# Patient Record
Sex: FEMALE | Race: BLACK OR AFRICAN AMERICAN | NOT HISPANIC OR LATINO | Employment: UNEMPLOYED | URBAN - METROPOLITAN AREA
[De-identification: names, ages, dates, MRNs, and addresses within clinical notes are randomized per-mention and may not be internally consistent; named-entity substitution may affect disease eponyms.]

---
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
## 2020-10-03 ENCOUNTER — HOSPITAL ENCOUNTER (EMERGENCY)
Facility: HOSPITAL | Age: 20
Discharge: HOME | End: 2020-10-03
Attending: EMERGENCY MEDICINE
Payer: COMMERCIAL

## 2020-10-03 VITALS
DIASTOLIC BLOOD PRESSURE: 87 MMHG | HEART RATE: 116 BPM | OXYGEN SATURATION: 100 % | WEIGHT: 218 LBS | RESPIRATION RATE: 18 BRPM | SYSTOLIC BLOOD PRESSURE: 165 MMHG

## 2020-10-03 DIAGNOSIS — N30.90 CYSTITIS: Primary | ICD-10-CM

## 2020-10-03 LAB
BACTERIA UR QL AUTO: ABNORMAL /HPF
BILIRUB UR QL STRIP: NEGATIVE
CLARITY UR: ABNORMAL
COLOR UR: YELLOW
EXT PREG TEST URINE: NEGATIVE
EXT. CONTROL ED NAV: NORMAL
GLUCOSE UR STRIP-MCNC: NEGATIVE MG/DL
HGB UR QL STRIP.AUTO: ABNORMAL
KETONES UR STRIP-MCNC: NEGATIVE MG/DL
LEUKOCYTE ESTERASE UR QL STRIP: ABNORMAL
NITRITE UR QL STRIP: NEGATIVE
NON-SQ EPI CELLS URNS QL MICRO: ABNORMAL /HPF
PH UR STRIP.AUTO: 6 [PH]
PROT UR STRIP-MCNC: ABNORMAL MG/DL
RBC #/AREA URNS AUTO: ABNORMAL /HPF
SP GR UR STRIP.AUTO: 1.02 (ref 1–1.03)
UROBILINOGEN UR QL STRIP.AUTO: 1 E.U./DL
WBC #/AREA URNS AUTO: ABNORMAL /HPF

## 2020-10-03 PROCEDURE — 99284 EMERGENCY DEPT VISIT MOD MDM: CPT

## 2020-10-03 PROCEDURE — 87077 CULTURE AEROBIC IDENTIFY: CPT | Performed by: EMERGENCY MEDICINE

## 2020-10-03 PROCEDURE — 81025 URINE PREGNANCY TEST: CPT | Performed by: EMERGENCY MEDICINE

## 2020-10-03 PROCEDURE — 81001 URINALYSIS AUTO W/SCOPE: CPT | Performed by: EMERGENCY MEDICINE

## 2020-10-03 PROCEDURE — 87186 SC STD MICRODIL/AGAR DIL: CPT | Performed by: EMERGENCY MEDICINE

## 2020-10-03 PROCEDURE — 87086 URINE CULTURE/COLONY COUNT: CPT | Performed by: EMERGENCY MEDICINE

## 2020-10-03 PROCEDURE — 99284 EMERGENCY DEPT VISIT MOD MDM: CPT | Performed by: EMERGENCY MEDICINE

## 2020-10-03 RX ORDER — CEPHALEXIN 500 MG/1
500 CAPSULE ORAL EVERY 12 HOURS SCHEDULED
Qty: 14 CAPSULE | Refills: 0 | Status: SHIPPED | OUTPATIENT
Start: 2020-10-03 | End: 2020-10-10

## 2020-10-03 RX ORDER — CEPHALEXIN 500 MG/1
500 CAPSULE ORAL ONCE
Status: COMPLETED | OUTPATIENT
Start: 2020-10-03 | End: 2020-10-03

## 2020-10-03 RX ADMIN — CEPHALEXIN 500 MG: 500 CAPSULE ORAL at 02:19

## 2020-10-05 LAB — BACTERIA UR CULT: ABNORMAL

## 2022-09-13 ENCOUNTER — TELEPHONE (OUTPATIENT)
Dept: BARIATRICS | Facility: CLINIC | Age: 22
End: 2022-09-13

## 2022-09-13 NOTE — TELEPHONE ENCOUNTER
yanet for pt to call back and schedule appt with provider she is interested in VLCD after attending info seminar

## 2022-09-27 ENCOUNTER — CONSULT (OUTPATIENT)
Dept: BARIATRICS | Facility: CLINIC | Age: 22
End: 2022-09-27
Payer: COMMERCIAL

## 2022-09-27 VITALS
BODY MASS INDEX: 38.29 KG/M2 | HEART RATE: 101 BPM | WEIGHT: 229.8 LBS | HEIGHT: 65 IN | SYSTOLIC BLOOD PRESSURE: 136 MMHG | DIASTOLIC BLOOD PRESSURE: 94 MMHG

## 2022-09-27 DIAGNOSIS — E66.9 OBESITY, CLASS II, BMI 35-39.9: Primary | ICD-10-CM

## 2022-09-27 PROCEDURE — 99203 OFFICE O/P NEW LOW 30 MIN: CPT | Performed by: FAMILY MEDICINE

## 2022-09-27 RX ORDER — NORGESTIMATE AND ETHINYL ESTRADIOL 7DAYSX3 28
1 KIT ORAL DAILY
COMMUNITY
Start: 2022-08-15

## 2022-09-27 NOTE — PROGRESS NOTES
Assessment/Plan:  Rea Cornell was seen today for consult  Diagnoses and all orders for this visit:    Obesity, Class II, BMI 35-39 9    Suspect secondary to excess caloric intake and lack of physical activity  Patient has decided to pursue very low-calorie diet  Concept of very low-calorie diet explained and patient acknowledges understanding  She will need to increase her water intake to at least 80 oz of water daily while on very low-calorie diet however up until then I recommended at least 64 oz of water daily  She will need to avoid strenuous exercise while on very low-calorie diet  She did bring in labs for review  CBC unremarkable  CMP with mild elevation of creatinine  See below for further discussion  She did bring in EKG for review  EKG unremarkable  Labs and EKG will be scanned into epic  VLCD Review:  Contraindications: no  Labs ordered: yes, magnesium, thyroid and BMP to reassess renal function due to slightly elevated creatinine on recent labs patient brought in for review  I have asked that she make sure she is well hydrated prior to having her labs drawn as in adequate water intake is likely the culprit of her slight rise in the creatinine  EKG ordered: no, patient already had EKG performed in August and was unremarkable  Results scanned into epic  HTN meds addressed: n/a  DM2 meds addressed: n/a  VLCD time restriction based on BMI: no  LMP/OCP:  On oral contraceptive but will need urine pregnancy testing prior to starting very low-calorie diet  - Discussed options of [facility]-Intensive Lifestyle Intervention Program, Very Low Calorie Diet-VLCD and Conservative Program and the role of weight loss medications  - Explained the importance of making lifestyle changes first before starting any anti-obesity medications  Patient should demonstrate lifestyle changes first before anti-obesity medication can be initiated     - Patient is interested in pursuing Very Low Calorie Diet-VLCD  - Initial weight loss goal of 5-10% weight loss for improved health  - Weight loss can improve patient's co-morbid conditions and/or prevent weight-related complications  Total time spent: 30 min, with >50% face-to-face time spent counseling patient on nonsurgical interventions for the treatment of excess weight  Discussed in detail nonsurgical options including intensive lifestyle intervention program, very low-calorie diet program and conservative program   Discussed the role of weight loss medications  Counseled patient on diet behavior and exercise modification for weight loss  Follow up in approximately 2 weeks with Non-Surgical Dietician  Subjective:   Chief Complaint   Patient presents with    Consult     Pt is here today for MWM consult  Patient ID: Barak Lee  is a 25 y o  female with excess weight/obesity here to pursue weight management  Previous notes and records have been reviewed  History reviewed  No pertinent past medical history  Past Surgical History:   Procedure Laterality Date    WISDOM TOOTH EXTRACTION Bilateral 2014       HPI:  Patient presents for medical weight management consultation  Wt Readings from Last 20 Encounters:   09/27/22 104 kg (229 lb 12 8 oz)   10/03/20 98 9 kg (218 lb)   02/09/16 82 1 kg (181 lb) (97 %, Z= 1 83)*   11/11/15 80 7 kg (178 lb) (96 %, Z= 1 80)*     * Growth percentiles are based on CDC (Girls, 2-20 Years) data       Obesity/Excess Weight:  Severity: Moderate  Onset:  lifelong    Modifiers: Diet and Exercise  Contributing factors: Poor Food Choices and Insufficient Physical Activity  Associated symptoms: increased joint pain    B- skips (5/7 days) or eggs and ramirez  S- no  L- takeout due to lack of time  S- no  D- rice, bbq chicken and brocoli  S- no    Hydration: 16oz water daily, 8oz coffee sweetened creamer daily (not measured), 24-32oz apple juice  Alcohol: no  Smoking: no  Exercise: no  Occupation: IT (sedentary)  Sleep:8hrs  STOP bang: 3/8    The following portions of the patient's history were reviewed and updated as appropriate: allergies, current medications, past family history, past medical history, past social history, past surgical history, and problem list     Review of Systems   Constitutional: Negative for fever  Respiratory: Negative for shortness of breath  Cardiovascular: Negative for chest pain  Gastrointestinal: Negative for abdominal pain and blood in stool  Genitourinary: Negative for dysuria and hematuria  Musculoskeletal: Positive for arthralgias and back pain  Negative for myalgias  Skin: Positive for rash (PCP started cream   Pt unsure of name of med  )  Neurological: Negative for headaches  Psychiatric/Behavioral: Negative for dysphoric mood and suicidal ideas  The patient is not nervous/anxious  Objective:  /94 (BP Location: Left arm, Patient Position: Sitting, Cuff Size: Large)   Pulse 101   Ht 5' 5" (1 651 m)   Wt 104 kg (229 lb 12 8 oz)   LMP 09/21/2022   BMI 38 24 kg/m²   Constitutional: Well-developed, well-nourished and Obese Body mass index is 38 24 kg/m²  Mariola Peck HEENT: No conjunctival injection  Pulmonary: No increased work of breathing or signs of respiratory distress  CV: Well-perfused  GI: Obese  Non-distended  MSK: No edema   Neuro: Oriented to person, place and time  Normal Speech  Normal gait  Psych: Normal affect and mood  Labs and Imaging  Recent labs and imaging have been personally reviewed    No results found for: WBC, HGB, HCT, MCV, PLT  No results found for: NA, SODIUM, K, CL, CO2, ANIONGAP, AGAP, BUN, CREATININE, GLUC, GLUF, CALCIUM, AST, ALT, ALKPHOS, PROT, TP, BILITOT, TBILI, EGFR  No results found for: HGBA1C  No results found for: [id number], TSH  No results found for: CHOLESTEROL  No results found for: HDL  No results found for: TRIG  No results found for: [facility]

## 2023-09-22 ENCOUNTER — HOSPITAL ENCOUNTER (OUTPATIENT)
Dept: NON INVASIVE DIAGNOSTICS | Facility: HOSPITAL | Age: 23
Discharge: HOME | End: 2023-09-22
Attending: INTERNAL MEDICINE
Payer: COMMERCIAL

## 2023-09-22 ENCOUNTER — APPOINTMENT (OUTPATIENT)
Dept: LAB | Facility: HOSPITAL | Age: 23
End: 2023-09-22
Payer: COMMERCIAL

## 2023-09-22 VITALS
HEIGHT: 65 IN | SYSTOLIC BLOOD PRESSURE: 152 MMHG | WEIGHT: 229.28 LBS | DIASTOLIC BLOOD PRESSURE: 95 MMHG | HEART RATE: 96 BPM | BODY MASS INDEX: 38.2 KG/M2

## 2023-09-22 DIAGNOSIS — E66.1 DRUG-INDUCED OBESITY IN PEDIATRIC PATIENT, UNSPECIFIED BMI, UNSPECIFIED WHETHER SERIOUS COMORBIDITY PRESENT: ICD-10-CM

## 2023-09-22 DIAGNOSIS — R07.9 CHEST PAIN: ICD-10-CM

## 2023-09-22 DIAGNOSIS — Z00.00 ROUTINE GENERAL MEDICAL EXAMINATION AT A HEALTH CARE FACILITY: ICD-10-CM

## 2023-09-22 DIAGNOSIS — R07.89 OTHER CHEST PAIN: ICD-10-CM

## 2023-09-22 LAB
AORTIC ROOT: 2.6 CM
APICAL FOUR CHAMBER EJECTION FRACTION: 73 %
E WAVE DECELERATION TIME: 197 MS
FRACTIONAL SHORTENING: 36 (ref 28–44)
INTERVENTRICULAR SEPTUM IN DIASTOLE (PARASTERNAL SHORT AXIS VIEW): 1 CM
INTERVENTRICULAR SEPTUM: 1 CM (ref 0.6–1.1)
LAAS-AP2: 17.8 CM2
LAAS-AP4: 17.3 CM2
LEFT ATRIUM SIZE: 3.5 CM
LEFT ATRIUM VOLUME (MOD BIPLANE): 44 ML
LEFT INTERNAL DIMENSION IN SYSTOLE: 3 CM (ref 2.1–4)
LEFT VENTRICULAR INTERNAL DIMENSION IN DIASTOLE: 4.7 CM (ref 3.5–6)
LEFT VENTRICULAR POSTERIOR WALL IN END DIASTOLE: 1 CM
LEFT VENTRICULAR STROKE VOLUME: 69 ML
LVSV (TEICH): 69 ML
MV E'TISSUE VEL-LAT: 15 CM/S
MV E'TISSUE VEL-SEP: 11 CM/S
MV PEAK A VEL: 0.62 M/S
MV PEAK E VEL: 85 CM/S
MV STENOSIS PRESSURE HALF TIME: 57 MS
MV VALVE AREA P 1/2 METHOD: 3.86
RIGHT ATRIUM AREA SYSTOLE A4C: 9.1 CM2
RIGHT VENTRICLE ID DIMENSION: 2.8 CM
SL CV LEFT ATRIUM LENGTH A2C: 5.2 CM
SL CV LV EF: 60
SL CV PED ECHO LEFT VENTRICLE DIASTOLIC VOLUME (MOD BIPLANE) 2D: 102 ML
SL CV PED ECHO LEFT VENTRICLE SYSTOLIC VOLUME (MOD BIPLANE) 2D: 34 ML
TR MAX PG: 15 MMHG
TR PEAK VELOCITY: 1.9 M/S
TRICUSPID ANNULAR PLANE SYSTOLIC EXCURSION: 1.7 CM
TRICUSPID VALVE PEAK REGURGITATION VELOCITY: 1.93 M/S

## 2023-09-22 PROCEDURE — 93306 TTE W/DOPPLER COMPLETE: CPT

## 2023-09-22 PROCEDURE — 93306 TTE W/DOPPLER COMPLETE: CPT | Performed by: INTERNAL MEDICINE

## 2023-09-24 LAB
ATRIAL RATE: 91 BPM
P AXIS: 61 DEGREES
PR INTERVAL: 184 MS
QRS AXIS: 79 DEGREES
QRSD INTERVAL: 80 MS
QT INTERVAL: 348 MS
QTC INTERVAL: 428 MS
T WAVE AXIS: 33 DEGREES
VENTRICULAR RATE: 91 BPM

## 2023-09-24 PROCEDURE — 93010 ELECTROCARDIOGRAM REPORT: CPT | Performed by: INTERNAL MEDICINE

## 2024-06-17 DIAGNOSIS — Z00.6 ENCOUNTER FOR EXAMINATION FOR NORMAL COMPARISON OR CONTROL IN CLINICAL RESEARCH PROGRAM: ICD-10-CM

## 2024-06-21 ENCOUNTER — APPOINTMENT (OUTPATIENT)
Dept: LAB | Facility: HOSPITAL | Age: 24
End: 2024-06-21

## 2024-06-21 DIAGNOSIS — Z00.6 ENCOUNTER FOR EXAMINATION FOR NORMAL COMPARISON OR CONTROL IN CLINICAL RESEARCH PROGRAM: ICD-10-CM

## 2024-06-21 PROCEDURE — 36415 COLL VENOUS BLD VENIPUNCTURE: CPT

## 2024-07-05 LAB
APOB+LDLR+PCSK9 GENE MUT ANL BLD/T: NOT DETECTED
BRCA1+BRCA2 DEL+DUP + FULL MUT ANL BLD/T: NOT DETECTED
MLH1+MSH2+MSH6+PMS2 GN DEL+DUP+FUL M: NOT DETECTED

## 2025-06-09 ENCOUNTER — APPOINTMENT (EMERGENCY)
Dept: RADIOLOGY | Facility: HOSPITAL | Age: 25
End: 2025-06-09
Payer: COMMERCIAL

## 2025-06-09 ENCOUNTER — HOSPITAL ENCOUNTER (EMERGENCY)
Facility: HOSPITAL | Age: 25
Discharge: HOME | End: 2025-06-09
Payer: COMMERCIAL

## 2025-06-09 VITALS
RESPIRATION RATE: 18 BRPM | BODY MASS INDEX: 37.21 KG/M2 | SYSTOLIC BLOOD PRESSURE: 140 MMHG | DIASTOLIC BLOOD PRESSURE: 81 MMHG | HEART RATE: 103 BPM | WEIGHT: 223.6 LBS | OXYGEN SATURATION: 98 % | TEMPERATURE: 98.8 F

## 2025-06-09 DIAGNOSIS — R07.89 NON-CARDIAC CHEST PAIN: Primary | ICD-10-CM

## 2025-06-09 LAB
ALBUMIN SERPL BCG-MCNC: 4.1 G/DL (ref 3.5–5)
ALP SERPL-CCNC: 72 U/L (ref 34–104)
ALT SERPL W P-5'-P-CCNC: 11 U/L (ref 7–52)
ANION GAP SERPL CALCULATED.3IONS-SCNC: 4 MMOL/L (ref 4–13)
AST SERPL W P-5'-P-CCNC: 12 U/L (ref 13–39)
BASOPHILS # BLD AUTO: 0.04 THOUSANDS/ÂΜL (ref 0–0.1)
BASOPHILS NFR BLD AUTO: 1 % (ref 0–1)
BILIRUB SERPL-MCNC: 0.35 MG/DL (ref 0.2–1)
BUN SERPL-MCNC: 9 MG/DL (ref 5–25)
CALCIUM SERPL-MCNC: 8.5 MG/DL (ref 8.4–10.2)
CARDIAC TROPONIN I PNL SERPL HS: <2 NG/L (ref ?–50)
CHLORIDE SERPL-SCNC: 110 MMOL/L (ref 96–108)
CO2 SERPL-SCNC: 25 MMOL/L (ref 21–32)
CREAT SERPL-MCNC: 0.81 MG/DL (ref 0.6–1.3)
D DIMER PPP FEU-MCNC: 0.59 UG/ML FEU
EOSINOPHIL # BLD AUTO: 0.13 THOUSAND/ÂΜL (ref 0–0.61)
EOSINOPHIL NFR BLD AUTO: 2 % (ref 0–6)
ERYTHROCYTE [DISTWIDTH] IN BLOOD BY AUTOMATED COUNT: 13.7 % (ref 11.6–15.1)
EXT PREGNANCY TEST URINE: NEGATIVE
EXT. CONTROL: NORMAL
GFR SERPL CREATININE-BSD FRML MDRD: 101 ML/MIN/1.73SQ M
GLUCOSE SERPL-MCNC: 91 MG/DL (ref 65–140)
HCT VFR BLD AUTO: 38.7 % (ref 34.8–46.1)
HGB BLD-MCNC: 12.1 G/DL (ref 11.5–15.4)
IMM GRANULOCYTES # BLD AUTO: 0.03 THOUSAND/UL (ref 0–0.2)
IMM GRANULOCYTES NFR BLD AUTO: 0 % (ref 0–2)
LIPASE SERPL-CCNC: 60 U/L (ref 11–82)
LYMPHOCYTES # BLD AUTO: 1.92 THOUSANDS/ÂΜL (ref 0.6–4.47)
LYMPHOCYTES NFR BLD AUTO: 23 % (ref 14–44)
MAGNESIUM SERPL-MCNC: 1.9 MG/DL (ref 1.9–2.7)
MCH RBC QN AUTO: 28.6 PG (ref 26.8–34.3)
MCHC RBC AUTO-ENTMCNC: 31.3 G/DL (ref 31.4–37.4)
MCV RBC AUTO: 92 FL (ref 82–98)
MONOCYTES # BLD AUTO: 1.11 THOUSAND/ÂΜL (ref 0.17–1.22)
MONOCYTES NFR BLD AUTO: 13 % (ref 4–12)
NEUTROPHILS # BLD AUTO: 5.03 THOUSANDS/ÂΜL (ref 1.85–7.62)
NEUTS SEG NFR BLD AUTO: 61 % (ref 43–75)
NRBC BLD AUTO-RTO: 0 /100 WBCS
PLATELET # BLD AUTO: 388 THOUSANDS/UL (ref 149–390)
PMV BLD AUTO: 10.7 FL (ref 8.9–12.7)
POTASSIUM SERPL-SCNC: 4 MMOL/L (ref 3.5–5.3)
PROT SERPL-MCNC: 7.2 G/DL (ref 6.4–8.4)
RBC # BLD AUTO: 4.23 MILLION/UL (ref 3.81–5.12)
SODIUM SERPL-SCNC: 139 MMOL/L (ref 135–147)
TSH SERPL DL<=0.05 MIU/L-ACNC: 1.46 UIU/ML (ref 0.45–4.5)
WBC # BLD AUTO: 8.26 THOUSAND/UL (ref 4.31–10.16)

## 2025-06-09 PROCEDURE — 93005 ELECTROCARDIOGRAM TRACING: CPT

## 2025-06-09 PROCEDURE — 71275 CT ANGIOGRAPHY CHEST: CPT

## 2025-06-09 PROCEDURE — 99285 EMERGENCY DEPT VISIT HI MDM: CPT | Performed by: PHYSICIAN ASSISTANT

## 2025-06-09 PROCEDURE — 84443 ASSAY THYROID STIM HORMONE: CPT | Performed by: PHYSICIAN ASSISTANT

## 2025-06-09 PROCEDURE — 96361 HYDRATE IV INFUSION ADD-ON: CPT

## 2025-06-09 PROCEDURE — 36415 COLL VENOUS BLD VENIPUNCTURE: CPT | Performed by: PHYSICIAN ASSISTANT

## 2025-06-09 PROCEDURE — 85025 COMPLETE CBC W/AUTO DIFF WBC: CPT | Performed by: PHYSICIAN ASSISTANT

## 2025-06-09 PROCEDURE — 83690 ASSAY OF LIPASE: CPT | Performed by: PHYSICIAN ASSISTANT

## 2025-06-09 PROCEDURE — 71045 X-RAY EXAM CHEST 1 VIEW: CPT

## 2025-06-09 PROCEDURE — 80053 COMPREHEN METABOLIC PANEL: CPT | Performed by: PHYSICIAN ASSISTANT

## 2025-06-09 PROCEDURE — 85379 FIBRIN DEGRADATION QUANT: CPT | Performed by: PHYSICIAN ASSISTANT

## 2025-06-09 PROCEDURE — 84484 ASSAY OF TROPONIN QUANT: CPT | Performed by: PHYSICIAN ASSISTANT

## 2025-06-09 PROCEDURE — 81025 URINE PREGNANCY TEST: CPT | Performed by: PHYSICIAN ASSISTANT

## 2025-06-09 PROCEDURE — 83735 ASSAY OF MAGNESIUM: CPT | Performed by: PHYSICIAN ASSISTANT

## 2025-06-09 PROCEDURE — 96374 THER/PROPH/DIAG INJ IV PUSH: CPT

## 2025-06-09 PROCEDURE — 99285 EMERGENCY DEPT VISIT HI MDM: CPT

## 2025-06-09 RX ORDER — SEMAGLUTIDE 1.7 MG/.75ML
1.7 INJECTION, SOLUTION SUBCUTANEOUS
COMMUNITY
Start: 2025-04-17 | End: 2025-07-07

## 2025-06-09 RX ORDER — NAPROXEN 500 MG/1
500 TABLET ORAL 2 TIMES DAILY WITH MEALS
Qty: 30 TABLET | Refills: 0 | Status: SHIPPED | OUTPATIENT
Start: 2025-06-09

## 2025-06-09 RX ORDER — KETOROLAC TROMETHAMINE 30 MG/ML
15 INJECTION, SOLUTION INTRAMUSCULAR; INTRAVENOUS ONCE
Status: COMPLETED | OUTPATIENT
Start: 2025-06-09 | End: 2025-06-09

## 2025-06-09 RX ADMIN — KETOROLAC TROMETHAMINE 15 MG: 30 INJECTION, SOLUTION INTRAMUSCULAR; INTRAVENOUS at 20:19

## 2025-06-09 RX ADMIN — IOHEXOL 85 ML: 350 INJECTION, SOLUTION INTRAVENOUS at 19:42

## 2025-06-09 RX ADMIN — SODIUM CHLORIDE 1000 ML: 0.9 INJECTION, SOLUTION INTRAVENOUS at 18:00

## 2025-06-09 NOTE — ED PROVIDER NOTES
Time reflects when diagnosis was documented in both MDM as applicable and the Disposition within this note       Time User Action Codes Description Comment    6/9/2025  9:08 PM Italo Steen Add [R07.89] Non-cardiac chest pain           ED Disposition       ED Disposition   Discharge    Condition   Stable    Date/Time   Mon Jun 9, 2025  9:08 PM    Comment   Ritika Alberto discharge to home/self care.                   Assessment & Plan       Medical Decision Making  Patient is a 25-year-old female with no significant past medical or surgical history that presents to the emergency department with intermittent sharp shooting nonradiating left-sided chest pain symptoms for the past 4 days.      Patient hemodynamically stable and afebrile  No sirs  + wells, + perc + ddimer- palpitations/ chest pain on inspiration; reports recent flight last month to [place]- currently on oral contraceptives  Neg urine pregnancy  No leukocytosis no bandemia- doubt infection  Cxr with nad  Ecg nsr with no acute ischemia  Sign out to Dr. Steen pending remainder of blood labs and ct chest pe study    *Due to voice recognition software, sound alike and misspelled words may be contained in the documentation*      Amount and/or Complexity of Data Reviewed  Labs: ordered. Decision-making details documented in ED Course.  Radiology: ordered and independent interpretation performed. Decision-making details documented in ED Course.  ECG/medicine tests: ordered and independent interpretation performed. Decision-making details documented in ED Course.    Risk  Prescription drug management.        ED Course as of 06/09/25 2146   Mon Jun 09, 2025   1851 D-Dimer, Quant(!): 0.59  Cta pe study ordered   1913 Sign out to Dr. Steen, pending blood work and ct scan       Medications   sodium chloride 0.9 % bolus 1,000 mL (0 mL Intravenous Stopped 6/9/25 2114)   ketorolac (TORADOL) injection 15 mg (15 mg Intravenous Given 6/9/25 2019)   iohexol (OMNIPAQUE)  "350 MG/ML injection (MULTI-DOSE) 85 mL (85 mL Intravenous Given 6/9/25 1942)       ED Risk Strat Scores                    No data recorded    PERC Rule for PE      Flowsheet Row Most Recent Value   PERC Rule for PE    Age >=50 0 Filed at: 06/09/2025 1649   HR >=100 1 Filed at: 06/09/2025 1649   O2 Sat on room air < 95% 0 Filed at: 06/09/2025 1649   History of PE or DVT 0 Filed at: 06/09/2025 1649   Recent trauma or surgery 0 Filed at: 06/09/2025 1649   Hemoptysis 0 Filed at: 06/09/2025 1649   Exogenous estrogen 0 Filed at: 06/09/2025 1649   Unilateral leg swelling 0 Filed at: 06/09/2025 1649   PERC Rule for PE Results 1 Filed at: 06/09/2025 1649            SBIRT 22yo+      Flowsheet Row Most Recent Value   Initial Alcohol Screen: US AUDIT-C     1. How often do you have a drink containing alcohol? 0 Filed at: 06/09/2025 1556   3b. FEMALE Any Age, or MALE 65+: How often do you have 4 or more drinks on one occassion? 0 Filed at: 06/09/2025 1556   Audit-C Score 0 Filed at: 06/09/2025 1556   VINNIE: How many times in the past year have you...    Used an illegal drug or used a prescription medication for non-medical reasons? Never Filed at: 06/09/2025 1556            Wells' Criteria for PE      Flowsheet Row Most Recent Value   Wells' Criteria for PE    Clinical signs and symptoms of DVT 0 Filed at: 06/09/2025 1648   PE is primary diagnosis or equally likely 0 Filed at: 06/09/2025 1648   HR >100 1.5 Filed at: 06/09/2025 1648   Immobilization at least 3 days or Surgery in the previous 4 weeks 0 Filed at: 06/09/2025 1648   Previous, objectively diagnosed PE or DVT 0 Filed at: 06/09/2025 1648   Hemoptysis 0 Filed at: 06/09/2025 1648   Malignancy with treatment within 6 months or palliative 0 Filed at: 06/09/2025 1648   Wells' Criteria Total 1.5 Filed at: 06/09/2025 1648                        History of Present Illness       Chief Complaint   Patient presents with    Chest Pain     States started on 6/5 with \" stabbing pains " "in my heart \". Had heart palpitations in past with phentermine which she does not take any longer      Patient is a 25-year-old female with no significant past medical or surgical history that presents to the emergency department with intermittent sharp shooting nonradiating left-sided chest pain symptoms for the past 4 days.  Patient affirms associated symptoms of palpitations beginning with the current ed presentation of left sided chest pain.  Patient states that in May she had a recent flight to [place].  Patient denies history of DVT and PE.  Patient states that she started Wegovy last year 2024.  Patient denies other stimulant or caffeine use.  Patient denies illicit drug use.  Patient denies any rash or skin changes.  Patient denies palliative and provocative factors.  Patient denies noneffective treatment.  Patient denies fevers, chills, nausea, vomiting, diarrhea, constipation and urine symptoms.  Patient denies recent fall and recent trauma.  Patient denies sick contacts and recent travel.  Patient denies shortness breath, and abdominal pain.    Past Medical History[1]   Past Surgical History[2]   Family History[3]   Social History[4]   E-Cigarette/Vaping    E-Cigarette Use Never User       E-Cigarette/Vaping Substances    Nicotine No     THC No     CBD No     Flavoring No     Other No     Unknown No       I have reviewed and agree with the history as documented.       History provided by:  Patient   used: No    Chest Pain  Associated symptoms: no abdominal pain, no back pain, no cough, no dizziness, no fever, no headache, no nausea, no numbness, no palpitations, no shortness of breath, not vomiting and no weakness        Review of Systems   Constitutional:  Negative for activity change, appetite change, chills and fever.   HENT:  Negative for congestion, ear pain, postnasal drip, rhinorrhea, sinus pressure, sinus pain, sore throat and tinnitus.    Eyes:  Negative for photophobia, pain " and visual disturbance.   Respiratory:  Negative for cough, chest tightness and shortness of breath.    Cardiovascular:  Positive for chest pain. Negative for palpitations.   Gastrointestinal:  Negative for abdominal pain, constipation, diarrhea, nausea and vomiting.   Genitourinary:  Negative for difficulty urinating, dysuria, flank pain, frequency, hematuria and urgency.   Musculoskeletal:  Negative for arthralgias, back pain, gait problem, neck pain and neck stiffness.   Skin:  Negative for color change, pallor and rash.   Allergic/Immunologic: Negative for environmental allergies and food allergies.   Neurological:  Negative for dizziness, seizures, syncope, weakness, numbness and headaches.   Psychiatric/Behavioral:  Negative for confusion.    All other systems reviewed and are negative.          Objective       ED Triage Vitals [06/09/25 1553]   Temperature Pulse Blood Pressure Respirations SpO2 Patient Position - Orthostatic VS   98.8 °F (37.1 °C) 103 140/81 18 98 % Sitting      Temp Source Heart Rate Source BP Location FiO2 (%) Pain Score    Tympanic Monitor Left arm -- 8      Vitals      Date and Time Temp Pulse SpO2 Resp BP Pain Score FACES Pain Rating User   06/09/25 2026 -- -- -- -- -- 3 -- AM   06/09/25 1553 98.8 °F (37.1 °C) 103 98 % 18 140/81 8 -- SW            Physical Exam  Vitals and nursing note reviewed.   Constitutional:       General: She is awake.      Appearance: Normal appearance. She is well-developed. She is not ill-appearing, toxic-appearing or diaphoretic.      Comments: /81 (BP Location: Left arm)   Pulse 103   Temp 98.8 °F (37.1 °C) (Tympanic)   Resp 18   Wt 101 kg (223 lb 9.6 oz)   LMP 05/14/2025 (Exact Date)   SpO2 98%   BMI 37.21 kg/m²      HENT:      Head: Normocephalic and atraumatic.      Right Ear: Hearing and external ear normal. No decreased hearing noted. No drainage, swelling or tenderness. No mastoid tenderness.      Left Ear: Hearing and external ear normal.  No decreased hearing noted. No drainage, swelling or tenderness. No mastoid tenderness.      Nose: Nose normal.      Mouth/Throat:      Lips: Pink.      Mouth: Mucous membranes are moist.      Pharynx: Oropharynx is clear. Uvula midline.     Eyes:      General: Lids are normal. Vision grossly intact.         Right eye: No discharge.         Left eye: No discharge.      Extraocular Movements: Extraocular movements intact.      Conjunctiva/sclera: Conjunctivae normal.      Pupils: Pupils are equal, round, and reactive to light.     Neck:      Vascular: No JVD.      Trachea: Trachea and phonation normal. No tracheal tenderness or tracheal deviation.     Cardiovascular:      Rate and Rhythm: Normal rate and regular rhythm.      Pulses: Normal pulses.           Radial pulses are 2+ on the right side and 2+ on the left side.        Posterior tibial pulses are 2+ on the right side and 2+ on the left side.      Heart sounds: Normal heart sounds.   Pulmonary:      Effort: Pulmonary effort is normal.      Breath sounds: Normal breath sounds. No stridor. No decreased breath sounds, wheezing, rhonchi or rales.   Chest:      Chest wall: No tenderness.   Abdominal:      General: Abdomen is flat. Bowel sounds are normal. There is no distension.      Palpations: Abdomen is soft. Abdomen is not rigid.      Tenderness: There is no abdominal tenderness. There is no guarding or rebound.     Musculoskeletal:         General: Normal range of motion.      Cervical back: Full passive range of motion without pain, normal range of motion and neck supple. No rigidity. No spinous process tenderness or muscular tenderness. Normal range of motion.      Right lower leg: Normal.      Left lower leg: Normal.   Lymphadenopathy:      Head:      Right side of head: No submental, submandibular, tonsillar, preauricular, posterior auricular or occipital adenopathy.      Left side of head: No submental, submandibular, tonsillar, preauricular, posterior  auricular or occipital adenopathy.      Cervical: No cervical adenopathy.      Right cervical: No superficial, deep or posterior cervical adenopathy.     Left cervical: No superficial, deep or posterior cervical adenopathy.     Skin:     General: Skin is warm.      Capillary Refill: Capillary refill takes less than 2 seconds.     Neurological:      General: No focal deficit present.      Mental Status: She is alert and oriented to person, place, and time.      GCS: GCS eye subscore is 4. GCS verbal subscore is 5. GCS motor subscore is 6.      Sensory: No sensory deficit.     Psychiatric:         Mood and Affect: Mood normal.         Speech: Speech normal.         Behavior: Behavior normal. Behavior is cooperative.         Thought Content: Thought content normal.         Judgment: Judgment normal.         Results Reviewed       Procedure Component Value Units Date/Time    HS Troponin I 4hr [[id number]]     Lab Status: No result Specimen: Blood     TSH, 3rd generation [[id number]]  (Normal) Collected: 06/09/25 1859    Lab Status: Final result Specimen: Blood from Arm, Left Updated: 06/09/25 1941     TSH 3RD GENERATON 1.458 uIU/mL     Magnesium [[id number]]  (Normal) Collected: 06/09/25 1859    Lab Status: Final result Specimen: Blood from Arm, Left Updated: 06/09/25 1928     Magnesium 1.9 mg/dL     Comprehensive metabolic panel [[id number]]  (Abnormal) Collected: 06/09/25 1859    Lab Status: Final result Specimen: Blood from Arm, Left Updated: 06/09/25 1928     Sodium 139 mmol/L      Potassium 4.0 mmol/L      Chloride 110 mmol/L      CO2 25 mmol/L      ANION GAP 4 mmol/L      BUN 9 mg/dL      Creatinine 0.81 mg/dL      Glucose 91 mg/dL      Calcium 8.5 mg/dL      AST 12 U/L      ALT 11 U/L      Alkaline Phosphatase 72 U/L      Total Protein 7.2 g/dL      Albumin 4.1 g/dL      Total Bilirubin 0.35 mg/dL      eGFR 101 ml/min/1.73sq m     Narrative:      National Kidney Disease Foundation guidelines for Chronic Kidney  Disease (CKD):     Stage 1 with normal or high GFR (GFR > 90 mL/min/1.73 square meters)    Stage 2 Mild CKD (GFR = 60-89 mL/min/1.73 square meters)    Stage 3A Moderate CKD (GFR = 45-59 mL/min/1.73 square meters)    Stage 3B Moderate CKD (GFR = 30-44 mL/min/1.73 square meters)    Stage 4 Severe CKD (GFR = 15-29 mL/min/1.73 square meters)    Stage 5 End Stage CKD (GFR <15 mL/min/1.73 square meters)  Note: GFR calculation is accurate only with a steady state creatinine    Lipase [[id number]]  (Normal) Collected: 06/09/25 1859    Lab Status: Final result Specimen: Blood from Arm, Left Updated: 06/09/25 1928     Lipase 60 u/L     HS Troponin I 2hr [[id number]]     Lab Status: No result Specimen: Blood     HS Troponin 0hr (reflex protocol) [[id number]]  (Normal) Collected: 06/09/25 1859    Lab Status: Final result Specimen: Blood from Arm, Left Updated: 06/09/25 1928     hs TnI 0hr <2 ng/L     POCT pregnancy, urine [[id number]]  (Normal) Collected: 06/09/25 1903    Lab Status: Final result Updated: 06/09/25 1906     EXT Preg Test, Ur Negative     Control Valid    D-Dimer [[id number]]  (Abnormal) Collected: 06/09/25 1800    Lab Status: Final result Specimen: Blood from Arm, Left Updated: 06/09/25 1840     D-Dimer, Quant 0.59 ug/ml FEU     CBC and differential [[id number]]  (Abnormal) Collected: 06/09/25 1800    Lab Status: Final result Specimen: Blood from Arm, Left Updated: 06/09/25 1821     WBC 8.26 Thousand/uL      RBC 4.23 Million/uL      Hemoglobin 12.1 g/dL      Hematocrit 38.7 %      MCV 92 fL      MCH 28.6 pg      MCHC 31.3 g/dL      RDW 13.7 %      MPV 10.7 fL      Platelets 388 Thousands/uL      nRBC 0 /100 WBCs      Segmented % 61 %      Immature Grans % 0 %      Lymphocytes % 23 %      Monocytes % 13 %      Eosinophils Relative 2 %      Basophils Relative 1 %      Absolute Neutrophils 5.03 Thousands/µL      Absolute Immature Grans 0.03 Thousand/uL      Absolute Lymphocytes 1.92 Thousands/µL      Absolute Monocytes  1.11 Thousand/µL      Eosinophils Absolute 0.13 Thousand/µL      Basophils Absolute 0.04 Thousands/µL             CTA chest pe study   Final Interpretation by Isidro Guallpa MD (06/09 2054)      No pulmonary embolus. Breathing motion artifact                        Computerized Assisted Algorithm (CAA) may have aided analysis of applicable images.      Workstation performed: [id number]         X-ray chest 1 view portable   ED Interpretation by Bro Menchaca PA-C (06/09 1835)   No acute cardiopulmonary disease on initial read by me      Final Interpretation by Asim Godfrey MD (06/09 2134)      No acute cardiopulmonary disease.            Workstation performed: [id number]             ECG 12 Lead Documentation Only    Date/Time: 6/9/2025 4:00 PM    Performed by: Bro Menchaca PA-C  Authorized by: Bro Menchaca PA-C    Indications / Diagnosis:  Chest pain  ECG reviewed by me, the ED Provider: yes    Patient location:  ED  Previous ECG:     Previous ECG:  Compared to current    Comparison ECG info:  When compared with ECG on September 22, 2023, no significant changes are noted.    Similarity:  No change    Comparison to cardiac monitor: Yes    Interpretation:     Interpretation: normal    Rate:     ECG rate:  91    ECG rate assessment: normal    Rhythm:     Rhythm: sinus rhythm    Ectopy:     Ectopy: none    QRS:     QRS axis:  Normal    QRS intervals:  Normal  Conduction:     Conduction: normal    ST segments:     ST segments:  Normal  T waves:     T waves: normal        ED Medication and Procedure Management   Prior to Admission Medications   Prescriptions Last Dose Informant Patient Reported? Taking?   Tri-Sprintec 0.18/0.215/0.25 MG-35 MCG per tablet   Yes No   Sig: Take 1 tablet by mouth daily   Wegovy 1.7 MG/0.75ML   Yes Yes   Sig: Inject 1.7 mg under the skin every 7 days      Facility-Administered Medications: None     Discharge Medication List as of 6/9/2025  9:09 PM        START taking these  medications    Details   naproxen (Naprosyn) 500 mg tablet Take 1 tablet (500 mg total) by mouth 2 (two) times a day with meals, Starting Mon 6/9/2025, Normal           CONTINUE these medications which have NOT CHANGED    Details   Wegovy 1.7 MG/0.75ML Inject 1.7 mg under the skin every 7 days, Starting Thu 4/17/2025, Until Mon 7/7/2025, Historical Med      Tri-Sprintec 0.18/0.215/0.25 MG-35 MCG per tablet Take 1 tablet by mouth daily, Starting Mon 8/15/2022, Historical Med           No discharge procedures on file.  ED SEPSIS DOCUMENTATION   Time reflects when diagnosis was documented in both MDM as applicable and the Disposition within this note       Time User Action Codes Description Comment    6/9/2025  9:08 PM Italo Steen Add [R07.89] Non-cardiac chest pain                      [1] No past medical history on file.  [2]   Past Surgical History:  Procedure Laterality Date    WISDOM TOOTH EXTRACTION Bilateral 2014   [3]   Family History  Problem Relation Name Age of Onset    Diabetes Mother      Asthma Mother      Hypertension Father      Diabetes Maternal Grandmother      Alcohol abuse Maternal Grandfather      Diabetes Paternal Grandmother     [4]   Social History  Tobacco Use    Smoking status: Never    Smokeless tobacco: Never   Vaping Use    Vaping status: Never Used   Substance Use Topics    Alcohol use: Never    Drug use: Never        Bro Menchaca PA-C  06/09/25 3182

## 2025-06-13 LAB
ATRIAL RATE: 91 BPM
P AXIS: 60 DEGREES
PR INTERVAL: 170 MS
QRS AXIS: 94 DEGREES
QRSD INTERVAL: 82 MS
QT INTERVAL: 348 MS
QTC INTERVAL: 429 MS
T WAVE AXIS: 34 DEGREES
VENTRICULAR RATE: 91 BPM

## 2025-06-13 PROCEDURE — 93010 ELECTROCARDIOGRAM REPORT: CPT | Performed by: INTERNAL MEDICINE
